# Patient Record
Sex: MALE | Race: WHITE | URBAN - METROPOLITAN AREA
[De-identification: names, ages, dates, MRNs, and addresses within clinical notes are randomized per-mention and may not be internally consistent; named-entity substitution may affect disease eponyms.]

---

## 2017-03-22 ENCOUNTER — HISTORICAL (OUTPATIENT)
Dept: ADMINISTRATIVE | Facility: HOSPITAL | Age: 67
End: 2017-03-22

## 2017-05-03 ENCOUNTER — HISTORICAL (OUTPATIENT)
Dept: ADMINISTRATIVE | Facility: HOSPITAL | Age: 67
End: 2017-05-03

## 2021-08-08 ENCOUNTER — HISTORICAL (OUTPATIENT)
Dept: INFECTIOUS DISEASES | Facility: HOSPITAL | Age: 71
End: 2021-08-08

## 2022-04-30 NOTE — OP NOTE
Patient:   Renard Rolon Jr            MRN: [id number]            FIN: [id number]               Age:   67 years     Sex:  Male     :  1950   Associated Diagnoses:   None   Author:   Anirudh Lake MD      Preoperative Diagnosis: Cataract Left eye    Postoperative Diagnosis: Cataract Left eye    Procedure: Phacoemulsification with intaocular lens implantations Left eye    Surgeon: Anirudh Lake MD    Assistant: Deborah Marin [facility]    Anestheisa: Topical    Complications: None    The patient was brought to the laser area and positioned underneath the Catalys laser.  A surgical timeout, capsulotomy, lens fragmentation and limbal relaxing incisions were performed.  The patient was brought into the operating suite, where the patient was correctly identified as was the operative eye via timeout.  The patient was prepped and draped in a sterile ophthalmic fashion.  A lid speculum was placed in the operative eye and the microscope was brought into place.  A 1.0mm paracentesis was then made at (6) o'clock.  The anterior chamber was filled with Endocoat.  A (temporal) clear corneal incision was made with a 2.4 mm keratome.  A 6 mm corneal marking ring was used to charles the cornea centered over the visual axis.  A 5.00 mm continuous curvilinear capsulorhexis was fashioned using a cystotome and microcapsular forceps.  Hydrodissection and hydrodelineation was performed with upreserved 1% Xylocaine.  The nucleus was then phacoemulsified with the Abbott phacoemulsification hand-piece with a total of (1) EFX.  The cortex was then removed with the Poli I/A hand-piece. An ANTWAN lens model (ZXR00) with a power of (19.0) was placed in the capsular bag.  The Helon was then removed from the eye with the Poli I/A hand piece.  The anterior chamber was inflated and the wounds were hydrated with BSS.  The wounds were checked with Weck-Siobhan sponges and found to be watertight.  The lid speculum was removed and topical antibiotics  were placed on the operative eye.  The patient was brought to PACU in good condition.      Surgery Date 05/03/17 [facility]

## 2022-05-04 NOTE — HISTORICAL [facility] CERNER
This is a historical note converted from Leonid. Formatting and pictures may have been removed.  Please reference Leonid for original formatting and attached multimedia. Infectious Diseases Infusion Visit Note  ?  HPI  ?  A 71 year old male seen today in the [facility] infusion clinic for infusion of Casirivimab / Imdevimab. ?Pt met criteria based on BMI > 25, age 65 or older, and cardiovascular disease.  Tested positive for COVID19 on 8/2/2021.  ?  Have discussed the Emergency Use Authorization of Casirivimab / Imdevimab at length with the patient today to include the limitations of authorized use in patients with COVID-19 as well as the potential benefits and the limitations of said benefit. ?Have also discussed the [facility] for the use of the unapproved products is only intended for the treatment of mild to moderate COVID19 in adults with COVID19 who weigh at least 40 kg who are at high risk for progressing to severe COVID19 and/or hospitalization. ?This high risk is defined as:  ?  * Older age (>/= 65)  * BMI > 25  * Pregnancy  * CKD  * DM  * Immunosuppressive Disease or immunosuppressant medications  * Cardiovascular disease (including congenital heart disease) or HTN  * Chronic lung diseases (ex: COPD, Asthma { moderate / severe, interstitial lung disease, cystic fibrosis, and pulmonary HTN)  * Sickle cell disease  * Neurodevelopmental disorders (ex: cerebral palsy) or other complex medical conditions (ex: genetic or metabolic syndromes and severe congenital anomalies)  * Having a medical-related technological dependence (ex: tracheostomy, gastrostomy, or positive pressure ventilation (not related to COVID)  * Other medical conditions or factors that could place an individual for being at high risk leading to progression of disease (additional risk-benefit situations to include, but not limited to, race or ethnicity)?  ?  Have discussed that these drugs must be administered via IV infusion in a supervised setting.  ?  ?  Objective:  PE: ?VS reviewed and stable, as documented in infusion chart  ?? ? ? ?Gen: ?AAOx3 in NAD  ?? ? ? ?HEENT: Atraumatic, normocephalic  ????????CVS: ?RRR no m/r/g noted  ?? ? ? ?C/L: ?Breathing unlabored, Lungs CTA bilateral, equal expansion of the chest wall  ?  Labs: ?Positive COVID 19 test 8/2/2021.  ?  Impression/Recommendations:  ?  COVID19  ?  - Have discussed at length with the patient regarding potential risks vs benefit of Casirivimab / Imdevimab infusion. ?[facility] fact sheet for patients, parents, and caregivers has been provided to pt. ?Alternative therapy reviewed. ?Patient is in agreement to receive infusion, plan for same today. ?Patient given aftercare instructions and number to call should issues or adverse effects arise. ??  ?